# Patient Record
Sex: MALE | Race: WHITE | Employment: UNEMPLOYED | URBAN - METROPOLITAN AREA
[De-identification: names, ages, dates, MRNs, and addresses within clinical notes are randomized per-mention and may not be internally consistent; named-entity substitution may affect disease eponyms.]

---

## 2020-10-31 ENCOUNTER — APPOINTMENT (OUTPATIENT)
Dept: CT IMAGING | Age: 40
End: 2020-10-31

## 2020-10-31 ENCOUNTER — HOSPITAL ENCOUNTER (EMERGENCY)
Age: 40
Discharge: HOME | End: 2020-11-01
Attending: EMERGENCY MEDICINE

## 2020-10-31 VITALS
HEIGHT: 67 IN | HEART RATE: 89 BPM | BODY MASS INDEX: 25.11 KG/M2 | SYSTOLIC BLOOD PRESSURE: 119 MMHG | DIASTOLIC BLOOD PRESSURE: 64 MMHG | TEMPERATURE: 98.1 F | RESPIRATION RATE: 16 BRPM | WEIGHT: 160 LBS | OXYGEN SATURATION: 97 %

## 2020-10-31 PROCEDURE — 72131 CT LUMBAR SPINE W/O DYE: CPT

## 2020-10-31 PROCEDURE — 72125 CT NECK SPINE W/O DYE: CPT

## 2020-10-31 PROCEDURE — 70450 CT HEAD/BRAIN W/O DYE: CPT

## 2020-10-31 PROCEDURE — 99284 EMERGENCY DEPT VISIT MOD MDM: CPT

## 2020-10-31 RX ORDER — ACETAMINOPHEN 500 MG
1000 TABLET ORAL ONCE
Status: COMPLETED | OUTPATIENT
Start: 2020-10-31 | End: 2020-11-01

## 2020-10-31 RX ORDER — METHOCARBAMOL 500 MG/1
1000 TABLET, FILM COATED ORAL ONCE
Status: COMPLETED | OUTPATIENT
Start: 2020-10-31 | End: 2020-11-01

## 2020-10-31 ASSESSMENT — PAIN DESCRIPTION - ORIENTATION: ORIENTATION: LOWER

## 2020-10-31 ASSESSMENT — PAIN SCALES - GENERAL: PAINLEVEL_OUTOF10: 7

## 2020-10-31 ASSESSMENT — PAIN DESCRIPTION - LOCATION: LOCATION: BACK

## 2020-10-31 ASSESSMENT — PAIN DESCRIPTION - PAIN TYPE: TYPE: ACUTE PAIN

## 2020-11-01 PROCEDURE — 6370000000 HC RX 637 (ALT 250 FOR IP): Performed by: EMERGENCY MEDICINE

## 2020-11-01 RX ORDER — METHOCARBAMOL 500 MG/1
500 TABLET, FILM COATED ORAL 3 TIMES DAILY PRN
Qty: 30 TABLET | Refills: 0 | Status: SHIPPED | OUTPATIENT
Start: 2020-11-01 | End: 2020-11-11

## 2020-11-01 RX ADMIN — ACETAMINOPHEN 1000 MG: 500 TABLET ORAL at 00:17

## 2020-11-01 RX ADMIN — METHOCARBAMOL TABLETS 1000 MG: 500 TABLET, COATED ORAL at 00:17
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
## 2020-11-01 NOTE — ED PROVIDER NOTES
Onset    No Known Problems Mother     Diabetes Father     No Known Problems Sister     Other Brother         deafness    Cancer Maternal Grandmother     No Known Problems Sister      SOCIAL HISTORY  Social History     Socioeconomic History    Marital status:      Spouse name: None    Number of children: None    Years of education: None    Highest education level: None   Occupational History    None   Social Needs    Financial resource strain: None    Food insecurity     Worry: None     Inability: None    Transportation needs     Medical: None     Non-medical: None   Tobacco Use    Smoking status: Current Every Day Smoker     Packs/day: 0.50     Types: Cigarettes   Substance and Sexual Activity    Alcohol use: No    Drug use: No    Sexual activity: None   Lifestyle    Physical activity     Days per week: None     Minutes per session: None    Stress: None   Relationships    Social connections     Talks on phone: None     Gets together: None     Attends Druze service: None     Active member of club or organization: None     Attends meetings of clubs or organizations: None     Relationship status: None    Intimate partner violence     Fear of current or ex partner: None     Emotionally abused: None     Physically abused: None     Forced sexual activity: None   Other Topics Concern    None   Social History Narrative    None       SURGICAL HISTORY  History reviewed. No pertinent surgical history.   CURRENT MEDICATIONS  Discharge Medication List as of 11/1/2020 12:17 AM      CONTINUE these medications which have NOT CHANGED    Details   traMADol (ULTRAM) 50 MG tablet Take 1 tablet by mouth 2 times daily as needed for Pain, Disp-60 tablet, R-0      naproxen (NAPROSYN) 500 MG tablet Take 1 tablet by mouth 2 times daily (with meals), Disp-60 tablet, R-3      nicotine (NICODERM CQ) 14 MG/24HR Place 1 patch onto the skin every 24 hours, Disp-30 patch, R-3      ibuprofen (IBU) 800 MG tablet Take 1 tablet by mouth every 8 hours as needed for Pain., Disp-12 tablet, R-0           ALLERGIES  No Known Allergies  PHYSICAL EXAM  VITAL SIGNS:   ED Triage Vitals   Enc Vitals Group      BP 10/31/20 2249 119/64      Pulse 10/31/20 2249 89      Resp 10/31/20 2249 16      Temp 10/31/20 2249 98.1 °F (36.7 °C)      Temp Source 10/31/20 2249 Oral      SpO2 10/31/20 2249 97 %      Weight 10/31/20 2240 160 lb (72.6 kg)      Height 10/31/20 2240 5' 7\" (1.702 m)      Head Circumference --       Peak Flow --       Pain Score --       Pain Loc --       Pain Edu? --       Excl. in [address]? --      Constitutional: Well developed, Well nourished, nontoxic appearing  HENT: Normocephalic, Atraumatic, Bilateral external ears normal, Oropharynx moist, No oral exudates, Nose normal.   Eyes: PERRL, EOMI, Conjunctiva normal, No discharge. No scleral icterus. Neck: Midline tenderness to palpation of the cervical spine. Tender to palpation of the right paracervical musculature. No carotid bruit  Lymphatic: No lymphadenopathy noted. Cardiovascular: Normal heart rate, Normal rhythm, No murmurs, gallops or rubs. Thorax & Lungs: Normal breath sounds, No respiratory distress, No wheezing. Abdomen: Soft, No tenderness, No masses, No pulsatile masses, No distention, Normal bowel sounds  Skin: Warm, Dry, Pink, No mottling, No erythema, No rash. Back: Tenderness palpation of the midline lumbar spine  Extremities: No edema, No tenderness, No cyanosis, Normal perfusion, No clubbing. Musculoskeletal: Good range of motion in all major joints as observed. No major deformities noted. Neurologic: Alert & oriented x 3, GCS 15, normal motor function, Normal sensory function, CN II-XII grossly intact as tested, No focal deficits noted. Psychiatric: Affect normal, Judgment normal, Mood normal.   EKG  NA  RADIOLOGY  Labs Reviewed - No data to display  I personally reviewed the images.  The radiologist's interpretation reveals:  Last Imaging results DO  11/01/20 0131

## 2021-05-30 ENCOUNTER — HOSPITAL ENCOUNTER (EMERGENCY)
Age: 41
Discharge: HOME | End: 2021-05-30

## 2021-05-30 VITALS
RESPIRATION RATE: 16 BRPM | OXYGEN SATURATION: 96 % | HEART RATE: 71 BPM | TEMPERATURE: 98.1 F | HEIGHT: 67 IN | WEIGHT: 174 LBS | DIASTOLIC BLOOD PRESSURE: 77 MMHG | BODY MASS INDEX: 27.31 KG/M2 | SYSTOLIC BLOOD PRESSURE: 110 MMHG

## 2021-05-30 DIAGNOSIS — R21 RASH AND OTHER NONSPECIFIC SKIN ERUPTION: Primary | ICD-10-CM

## 2021-05-30 PROCEDURE — 99283 EMERGENCY DEPT VISIT LOW MDM: CPT

## 2021-05-30 RX ORDER — CLOTRIMAZOLE 1 %
CREAM (GRAM) TOPICAL
Qty: 1 TUBE | Refills: 1 | Status: SHIPPED | OUTPATIENT
Start: 2021-05-30 | End: 2021-05-30 | Stop reason: SDUPTHER

## 2021-05-30 RX ORDER — CLOTRIMAZOLE 1 %
CREAM (GRAM) TOPICAL
Qty: 1 TUBE | Refills: 1 | Status: SHIPPED | OUTPATIENT
Start: 2021-05-30 | End: 2021-06-06

## 2021-05-30 RX ORDER — SULFAMETHOXAZOLE AND TRIMETHOPRIM 800; 160 MG/1; MG/1
1 TABLET ORAL 2 TIMES DAILY
Qty: 14 TABLET | Refills: 0 | Status: SHIPPED | OUTPATIENT
Start: 2021-05-30 | End: 2021-05-30 | Stop reason: SDUPTHER

## 2021-05-30 RX ORDER — SULFAMETHOXAZOLE AND TRIMETHOPRIM 800; 160 MG/1; MG/1
1 TABLET ORAL 2 TIMES DAILY
Qty: 14 TABLET | Refills: 0 | Status: SHIPPED | OUTPATIENT
Start: 2021-05-30 | End: 2021-06-06

## 2021-05-30 ASSESSMENT — PAIN SCALES - GENERAL: PAINLEVEL_OUTOF10: 8

## 2021-05-30 ASSESSMENT — PAIN DESCRIPTION - PAIN TYPE: TYPE: ACUTE PAIN

## 2021-05-30 ASSESSMENT — PAIN DESCRIPTION - DESCRIPTORS: DESCRIPTORS: ACHING

## 2021-05-30 ASSESSMENT — PAIN DESCRIPTION - LOCATION: LOCATION: HAND

## 2021-05-30 NOTE — ED PROVIDER NOTES
eMERGENCY dEPARTMENT eNCOUnter        [facility]    Chief Complaint   Patient presents with    Rash       HPI    Kwame Tran is a 42 y.o. male who presents with a rash. Onset was 3 days ago. The duration has been constant. The quality is initially pruritic, now painful. The location is right hand in between 3-4 and 4-5 fingers. Associated symptoms: none. Aggravating factors: none. REVIEW OF SYSTEMS    Pulmonary: No difficulty breathing or chest tightness  Skin: + rash  ENT: No throat swelling or tongue swelling  General: No fevers or syncope    PAST MEDICAL & SURGICAL HISTORY    Past Medical History:   Diagnosis Date    Chronic back pain      History reviewed. No pertinent surgical history. CURRENT MEDICATIONS    Current Outpatient Rx   Medication Sig Dispense Refill    clotrimazole (LOTRIMIN AF) 1 % cream Apply topically 2 times daily. 1 Tube 1    sulfamethoxazole-trimethoprim (BACTRIM DS) 800-160 MG per tablet Take 1 tablet by mouth 2 times daily for 7 days 14 tablet 0    traMADol (ULTRAM) 50 MG tablet Take 1 tablet by mouth 2 times daily as needed for Pain 60 tablet 0    naproxen (NAPROSYN) 500 MG tablet Take 1 tablet by mouth 2 times daily (with meals) 60 tablet 3    nicotine (NICODERM CQ) 14 MG/24HR Place 1 patch onto the skin every 24 hours 30 patch 3    ibuprofen (IBU) 800 MG tablet Take 1 tablet by mouth every 8 hours as needed for Pain.  12 tablet 0       ALLERGIES    No Known Allergies    SOCIAL & FAMILY HISTORY    Social History     Socioeconomic History    Marital status:      Spouse name: None    Number of children: None    Years of education: None    Highest education level: None   Occupational History    None   Tobacco Use    Smoking status: Current Every Day Smoker     Packs/day: 0.50     Types: Cigarettes    Smokeless tobacco: Never Used   Vaping Use    Vaping Use: Never used   Substance and Sexual Activity    Alcohol use: No    Drug use: No    Sexual activity: None   Other Topics Concern    None   Social History Narrative    None     Social Determinants of Health     Financial Resource Strain:     Difficulty of Paying Living Expenses:    Food Insecurity:     Worried About Running Out of Food in the Last Year:     [address] in the Last Year:    Transportation Needs:     Lack of Transportation (Medical):  Lack of Transportation (Non-Medical):    Physical Activity:     Days of Exercise per Week:     Minutes of Exercise per Session:    Stress:     Feeling of Stress :    Social Connections:     Frequency of Communication with Friends and Family:     Frequency of Social Gatherings with Friends and Family:     Attends Sabianism Services:     Active Member of Clubs or Organizations:     Attends Club or Organization Meetings:     Marital Status:    Intimate Partner Violence:     Fear of Current or Ex-Partner:     Emotionally Abused:     Physically Abused:     Sexually Abused:      Family History   Problem Relation Age of Onset    No Known Problems Mother     Diabetes Father     No Known Problems Sister     Other Brother         deafness    Cancer Maternal Grandmother     No Known Problems Sister        PHYSICAL EXAM    VITAL SIGNS: /77   Pulse 71   Temp 98.1 °F (36.7 °C) (Oral)   Resp 16   Ht 5' 7\" (1.702 m)   Wt 174 lb (78.9 kg)   SpO2 96%   BMI 27.25 kg/m²   Constitutional:  Well developed, well nourished, no acute distress  HENT:  Atraumatic, no facial or lip swelling  ORAL EXAM:  No tongue swelling, airway patent  NECK:  Supple, No neck swelling  Respiratory:  No respiratory distress, normal breath sounds   Cardiovascular:  No JVD   GI: nondistended, normal bowel sounds, nontender, no organomegaly   Musculoskeletal:  No edema, no acute deformities. Integument: There is a rash noted in the webspaces between the third and fourth and fourth and fifth fingers.   There is a cracked skin noted on the webspace as well as pustular lesions on the dorsal and palmar aspects of rash burn extends out of the webspace. It is mildly tender to palpation. There is a small red streak approximately 3 cm in length running on the dorsal aspect of the right hand. There are no petechiae, pustules, purpura, induration, desquamation, bullae. ED COURSE & MEDICAL DECISION MAKING    Pertinent Labs reviewed and interpreted. (See chart for details)  See chart for details of medications given during the ED stay. Vitals:    05/30/21 1050   BP: 110/77   Pulse: 71   Resp: 16   Temp: 98.1 °F (36.7 °C)   TempSrc: Oral   SpO2: 96%   Weight: 174 lb (78.9 kg)   Height: 5' 7\" (1.702 m)     Patient has a rash in the webspaces of the right hand between the third and fourth and fourth and fifth fingers. I does have some pustular lesions associated with it and is now painful. Previously was just pruritic. Appears to be tinea infection that has bacterial superinfection. Will cover with clotrimazole and Bactrim. Discussed with patient. Expect improvement in 2 days. Recommended wound check with PCP. Discussed returning to ED for any new or worsening symptoms. I have independently evaluated this patient. Differential diagnosis: Vasculitis, bacterial skin infection, viral rash, systemic infectious rash, Anaphylaxis, Urticaria, other    FINAL IMPRESSION    Rash          (Please note that this note was completed with a voice recognition program.  Every attempt was made to edit the dictations, but inevitably there remain words that are mis-transcribed. Louise Stewart PA-C  05/30/21 4949

## 2021-06-30 ENCOUNTER — HOSPITAL ENCOUNTER (EMERGENCY)
Age: 41
Discharge: HOME | End: 2021-06-30
Attending: EMERGENCY MEDICINE
Payer: MEDICAID

## 2021-06-30 VITALS
WEIGHT: 168 LBS | DIASTOLIC BLOOD PRESSURE: 60 MMHG | OXYGEN SATURATION: 95 % | HEIGHT: 67 IN | BODY MASS INDEX: 26.37 KG/M2 | TEMPERATURE: 98.3 F | HEART RATE: 72 BPM | RESPIRATION RATE: 18 BRPM | SYSTOLIC BLOOD PRESSURE: 112 MMHG

## 2021-06-30 DIAGNOSIS — L02.519 CELLULITIS AND ABSCESS OF HAND: ICD-10-CM

## 2021-06-30 DIAGNOSIS — I89.1 LYMPHANGITIS: ICD-10-CM

## 2021-06-30 DIAGNOSIS — L03.119 CELLULITIS AND ABSCESS OF HAND: ICD-10-CM

## 2021-06-30 DIAGNOSIS — L08.9 FINGER INFECTION: Primary | ICD-10-CM

## 2021-06-30 PROCEDURE — 99285 EMERGENCY DEPT VISIT HI MDM: CPT

## 2021-06-30 RX ORDER — CEPHALEXIN 500 MG/1
500 CAPSULE ORAL 4 TIMES DAILY
Qty: 28 CAPSULE | Refills: 0 | Status: SHIPPED | OUTPATIENT
Start: 2021-06-30 | End: 2021-07-07

## 2021-06-30 NOTE — ED PROVIDER NOTES
Sister     Other Brother         deafness    Cancer Maternal Grandmother     No Known Problems Sister      Social History     Socioeconomic History    Marital status:      Spouse name: None    Number of children: None    Years of education: None    Highest education level: None   Occupational History    None   Tobacco Use    Smoking status: Current Every Day Smoker     Packs/day: 0.50     Types: Cigarettes    Smokeless tobacco: Never Used   Vaping Use    Vaping Use: Never used   Substance and Sexual Activity    Alcohol use: No    Drug use: No    Sexual activity: None   Other Topics Concern    None   Social History Narrative    None     Social Determinants of Health     Financial Resource Strain:     Difficulty of Paying Living Expenses:    Food Insecurity:     Worried About Running Out of Food in the Last Year:     Ran Out of Food in the Last Year:    Transportation Needs:     Lack of Transportation (Medical):  Lack of Transportation (Non-Medical):    Physical Activity:     Days of Exercise per Week:     Minutes of Exercise per Session:    Stress:     Feeling of Stress :    Social Connections:     Frequency of Communication with Friends and Family:     Frequency of Social Gatherings with Friends and Family:     Attends Confucianism Services:     Active Member of Clubs or Organizations:     Attends Club or Organization Meetings:     Marital Status:    Intimate Partner Violence:     Fear of Current or Ex-Partner:     Emotionally Abused:     Physically Abused:     Sexually Abused:        PHYSICAL EXAM    VITAL SIGNS: /60   Pulse 72   Temp 98.3 °F (36.8 °C) (Oral)   Resp 18   Ht 5' 7\" (1.702 m)   Wt 168 lb (76.2 kg)   SpO2 95%   BMI 26.31 kg/m²    Constitutional:  Well developed, Well nourished  HENT:  Atraumatic, Normocephalic, PERRL, moist oral mucosa. NECK: Neck supple. Respiratory:  Normal effort, CTAB   Cardiovascular:  Regular rate and rhythm.  No murmurs. GI:  Soft, No tenderness. Extremities: Left ring finger on the palmar surface proximal aspect there is swelling noted with erythema, dry scaling of interdigit area between the little finger, ring finger and middle finger. Erythema present in these areas. Small amount of drainage present from the pustular lesion on the palmar aspect of the left middle finger. There is streaking up the forearm along the medial up to the axilla with swelling, tenderness. Is able to flex, extend the fingers, there is tenderness along the flexor tendon sheath on the left middle finger. Lymphatics: Axillary lymphadenopathy present on the left. Integument: See extremity exam above  Vascular: Distal pulses are intact. Neurologic:  Alert & oriented, no focal deficits. ED COURSE & MEDICAL DECISION MAKING      Vital signs and nursing notes reviewed during ED course. All pertinent Lab data and radiographic results reviewed with patient at bedside. The patient and/or the family were informed of the results of any tests/labs/imaging, the treatment plan, and time was allotted to answer questions. This is a 50-year-old male who presented with infection of the finger streaking up the arm. He was seen last night at [facility], there he was given a dose of Unasyn. He was transferred to [facility], evaluated by hand specialist and deemed to meet admission criteria. He was admitted to the hospital, given vancomycin. It appears they did a laboratory work-up at that time. He left there because they did not have any beds, it appears he must of been boarding in the emergency department. As we do not have hand specialty here and for a similar problem I would transfer to [facility] for evaluation I recommended transfer back to their facility for hand specialty evaluation. He states he will no not go back down there. I offered to send him via medic order to allow him to drive himself down there, but he will not go.   Do not have a hand specialist here that he needs and it is recommended that he follow-up with their facility, but as he will not go through with the treatment plan that I have recommended he wants to leave [address] It does appear they wrote him a prescription for Bactrim, tramadol and Zofran. I will add Keflex to this. I will provide him with the phone number for the hand specialist outpatient office and he is advised to call them this morning for follow-up. I do feel he needs to be admitted to the hospital for hand specialty evaluation and IV antibiotics, but he will not do so. He is electing to leave [address] He is awake, alert, does not appear intoxicated and capable of making that decision. Clinical  IMPRESSION    Finger infection with cellulitis and lymphangitis      Comment: Please note this report has been produced using speech recognition software and may contain errors related to that system including errors in grammar, punctuation, and spelling, as well as words and phrases that may be inappropriate. If there are any questions or concerns please feel free to contact the dictating provider for clarification.       Minerva Adames, DO  06/30/21 [id number]

## 2025-06-30 ENCOUNTER — HOSPITAL ENCOUNTER (EMERGENCY)
Age: 45
Discharge: HOME HEALTH | End: 2025-06-30
Payer: COMMERCIAL

## 2025-06-30 ENCOUNTER — APPOINTMENT (OUTPATIENT)
Dept: GENERAL RADIOLOGY | Age: 45
End: 2025-06-30
Payer: COMMERCIAL

## 2025-06-30 VITALS
SYSTOLIC BLOOD PRESSURE: 105 MMHG | HEART RATE: 74 BPM | DIASTOLIC BLOOD PRESSURE: 78 MMHG | WEIGHT: 167 LBS | BODY MASS INDEX: 26.21 KG/M2 | TEMPERATURE: 97.9 F | OXYGEN SATURATION: 98 % | RESPIRATION RATE: 20 BRPM | HEIGHT: 67 IN

## 2025-06-30 DIAGNOSIS — R07.9 CHEST PAIN, UNSPECIFIED TYPE: Primary | ICD-10-CM

## 2025-06-30 LAB
ALBUMIN SERPL-MCNC: 4.1 G/DL (ref 3.4–5)
ALBUMIN/GLOB SERPL: 1.6 {RATIO} (ref 1.1–2.2)
ALP SERPL-CCNC: 94 U/L (ref 40–129)
ALT SERPL-CCNC: 23 U/L (ref 10–40)
ANION GAP SERPL CALCULATED.3IONS-SCNC: 10 MMOL/L (ref 9–17)
AST SERPL-CCNC: 22 U/L (ref 15–37)
BASOPHILS # BLD: 0.02 K/UL
BASOPHILS NFR BLD: 0 % (ref 0–1)
BILIRUB SERPL-MCNC: 0.4 MG/DL (ref 0–1)
BUN SERPL-MCNC: 12 MG/DL (ref 7–20)
CALCIUM SERPL-MCNC: 9.4 MG/DL (ref 8.3–10.6)
CHLORIDE SERPL-SCNC: 108 MMOL/L (ref 99–110)
CO2 SERPL-SCNC: 27 MMOL/L (ref 21–32)
CREAT SERPL-MCNC: 0.8 MG/DL (ref 0.9–1.3)
EOSINOPHIL # BLD: 0.16 K/UL
EOSINOPHILS RELATIVE PERCENT: 1 % (ref 0–3)
ERYTHROCYTE [DISTWIDTH] IN BLOOD BY AUTOMATED COUNT: 12.1 % (ref 11.7–14.9)
GFR, ESTIMATED: >90 ML/MIN/1.73M2
GLUCOSE SERPL-MCNC: 88 MG/DL (ref 74–99)
HCT VFR BLD AUTO: 44.5 % (ref 42–52)
HGB BLD-MCNC: 14.5 G/DL (ref 13.5–18)
IMM GRANULOCYTES # BLD AUTO: 0.04 K/UL
IMM GRANULOCYTES NFR BLD: 0 %
LYMPHOCYTES NFR BLD: 3.26 K/UL
LYMPHOCYTES RELATIVE PERCENT: 26 % (ref 24–44)
MCH RBC QN AUTO: 29.1 PG (ref 27–31)
MCHC RBC AUTO-ENTMCNC: 32.6 G/DL (ref 32–36)
MCV RBC AUTO: 89.4 FL (ref 78–100)
MONOCYTES NFR BLD: 0.89 K/UL
MONOCYTES NFR BLD: 7 % (ref 0–5)
NEUTROPHILS NFR BLD: 65 % (ref 36–66)
NEUTS SEG NFR BLD: 8.09 K/UL
PLATELET # BLD AUTO: 267 K/UL (ref 140–440)
PMV BLD AUTO: 10.1 FL (ref 7.5–11.1)
POTASSIUM SERPL-SCNC: 3.9 MMOL/L (ref 3.5–5.1)
PROT SERPL-MCNC: 6.7 G/DL (ref 6.4–8.2)
RBC # BLD AUTO: 4.98 M/UL (ref 4.6–6.2)
SODIUM SERPL-SCNC: 144 MMOL/L (ref 136–145)
TROPONIN I SERPL HS-MCNC: <6 NG/L (ref 0–22)
TROPONIN I SERPL HS-MCNC: <6 NG/L (ref 0–22)
WBC OTHER # BLD: 12.5 K/UL (ref 4–10.5)

## 2025-06-30 PROCEDURE — 85025 COMPLETE CBC W/AUTO DIFF WBC: CPT

## 2025-06-30 PROCEDURE — 80053 COMPREHEN METABOLIC PANEL: CPT

## 2025-06-30 PROCEDURE — 6370000000 HC RX 637 (ALT 250 FOR IP): Performed by: PHYSICIAN ASSISTANT

## 2025-06-30 PROCEDURE — 71045 X-RAY EXAM CHEST 1 VIEW: CPT

## 2025-06-30 PROCEDURE — 93005 ELECTROCARDIOGRAM TRACING: CPT | Performed by: PHYSICIAN ASSISTANT

## 2025-06-30 PROCEDURE — 99285 EMERGENCY DEPT VISIT HI MDM: CPT

## 2025-06-30 PROCEDURE — 84484 ASSAY OF TROPONIN QUANT: CPT

## 2025-06-30 RX ORDER — ASPIRIN 81 MG/1
324 TABLET, CHEWABLE ORAL ONCE
Status: COMPLETED | OUTPATIENT
Start: 2025-06-30 | End: 2025-06-30

## 2025-06-30 RX ADMIN — ASPIRIN 324 MG: 81 TABLET, CHEWABLE ORAL at 21:02

## 2025-07-01 LAB
EKG ATRIAL RATE: 78 BPM
EKG DIAGNOSIS: NORMAL
EKG P AXIS: 53 DEGREES
EKG P-R INTERVAL: 158 MS
EKG Q-T INTERVAL: 380 MS
EKG QRS DURATION: 94 MS
EKG QTC CALCULATION (BAZETT): 433 MS
EKG R AXIS: 43 DEGREES
EKG T AXIS: 60 DEGREES
EKG VENTRICULAR RATE: 78 BPM

## 2025-07-01 PROCEDURE — 93010 ELECTROCARDIOGRAM REPORT: CPT | Performed by: INTERNAL MEDICINE

## 2025-07-01 ASSESSMENT — HEART SCORE: ECG: NORMAL

## 2025-07-01 NOTE — ED PROVIDER NOTES
visualize imaging studies as noted above, but interpretation performed by radiologist.      EKG (if obtained):  Please see supervising physician's note for interpretation.    Chronic conditions affecting care:   Past Medical History:   Diagnosis Date    Chronic back pain        Discussion with Other Profesionals : None    Social Determinants : Patient has significant healthcare illiteracy. Additional time provided in explanations. and no PCP    Records Reviewed : External ED Note [facility] ED 12/25/2023 for dental pain    Patient's EMR reviewed for information on past medical history, current medications, and any pertinent inpatient/outpatient encounters.      ED Course/Reassessment/Disposition:  Patient seen and examined.  EKG, labs, imaging obtained.  CXR clear.  No acute changes on EKG.  Negative troponin x 2.  HEART score is 2.  Feels better on re-examination.  Says he was throwing water balloons yesterday and wondering if he may have pulled a muscle.  Advised on close FU with Primary Care. Return as needed for any new/worsening symptoms.     Disposition Considerations (tests considered but not done, Shared Decision Making, Patient Expectation of Test or Treatment):     Appropriate for outpatient management      Stable at discharge.    I am the Primary Clinician of Record.  Supervising physician was Dr. Hylton.  Patient was seen independently.        CLINICAL IMPRESSION      1. Chest pain, unspecified type          DISPOSITION/PLAN   DISPOSITION Decision To Discharge 06/30/2025 11:11:59 PM    PATIENT REFERREDTO:  [facility] [place]  [address]   [address]  [place] [address]  [phone]        Clara Horne MD  [address]  [address]  [facility] [address]  [phone]      Doc of the Day      DISCHARGE MEDICATIONS:  Discharge Medication List as of 6/30/2025 11:13 PM          DISCONTINUED MEDICATIONS:  Discharge Medication List as of 6/30/2025 11:13 PM